# Patient Record
Sex: FEMALE | Race: WHITE | NOT HISPANIC OR LATINO | Employment: FULL TIME | URBAN - METROPOLITAN AREA
[De-identification: names, ages, dates, MRNs, and addresses within clinical notes are randomized per-mention and may not be internally consistent; named-entity substitution may affect disease eponyms.]

---

## 2017-06-12 ENCOUNTER — CONVERSION ENCOUNTER (OUTPATIENT)
Dept: FAMILY MEDICINE CLINIC | Facility: CLINIC | Age: 37
End: 2017-06-12

## 2017-06-12 ENCOUNTER — HOSPITAL ENCOUNTER (OUTPATIENT)
Dept: URGENT CARE | Facility: CLINIC | Age: 37
Setting detail: SPECIMEN
Discharge: HOME | End: 2017-06-12
Attending: FAMILY MEDICINE | Admitting: FAMILY MEDICINE

## 2017-06-12 LAB
BACTERIA SPEC AEROBE CULT: NORMAL
Lab: NORMAL
MICRO REPORT STATUS: NORMAL
SPECIMEN SOURCE: NORMAL

## 2017-09-10 ENCOUNTER — CONVERSION ENCOUNTER (OUTPATIENT)
Dept: FAMILY MEDICINE CLINIC | Facility: CLINIC | Age: 37
End: 2017-09-10

## 2018-04-06 ENCOUNTER — HOSPITAL ENCOUNTER (OUTPATIENT)
Dept: URGENT CARE | Facility: CLINIC | Age: 38
Setting detail: SPECIMEN
Discharge: HOME | End: 2018-04-06
Attending: EMERGENCY MEDICINE | Admitting: EMERGENCY MEDICINE

## 2018-04-06 ENCOUNTER — CONVERSION ENCOUNTER (OUTPATIENT)
Dept: FAMILY MEDICINE CLINIC | Facility: CLINIC | Age: 38
End: 2018-04-06

## 2018-04-06 LAB
AMPICILLIN SUSC ISLT: NORMAL
AZTREONAM SUSC ISLT: NORMAL
BACTERIA ISLT: NORMAL
BACTERIA SPEC AEROBE CULT: NORMAL
CEFAZOLIN SUSC ISLT: NORMAL
CEFEPIME SUSC ISLT: NORMAL
CEFTRIAXONE SUSC ISLT: NORMAL
CIPROFLOXACIN SUSC ISLT: NORMAL
COLONY COUNT: NORMAL
ERTAPENEM SUSC ISLT: NORMAL
LEVOFLOXACIN SUSC ISLT: NORMAL
Lab: NORMAL
MEROPENEM SUSC ISLT: NORMAL
MICRO REPORT STATUS: NORMAL
NITROFURANTOIN SUSC ISLT: NORMAL
PIP+TAZO SUSC ISLT: NORMAL
SPECIMEN SOURCE: NORMAL
SUSC METH SPEC: NORMAL
TETRACYCLINE SUSC ISLT: NORMAL
TOBRAMYCIN SUSC ISLT: NORMAL
TRIMETHOPRIM/SULFA: NORMAL

## 2018-08-01 ENCOUNTER — CONVERSION ENCOUNTER (OUTPATIENT)
Dept: FAMILY MEDICINE CLINIC | Facility: CLINIC | Age: 38
End: 2018-08-01

## 2018-08-28 ENCOUNTER — HOSPITAL ENCOUNTER (OUTPATIENT)
Dept: CARDIOLOGY | Facility: HOSPITAL | Age: 38
Discharge: HOME | End: 2018-08-28
Attending: INTERNAL MEDICINE | Admitting: INTERNAL MEDICINE

## 2018-09-12 ENCOUNTER — CONVERSION ENCOUNTER (OUTPATIENT)
Dept: FAMILY MEDICINE CLINIC | Facility: CLINIC | Age: 38
End: 2018-09-12

## 2019-06-04 VITALS
BODY MASS INDEX: 50.02 KG/M2 | HEIGHT: 64 IN | SYSTOLIC BLOOD PRESSURE: 135 MMHG | DIASTOLIC BLOOD PRESSURE: 89 MMHG | OXYGEN SATURATION: 97 % | SYSTOLIC BLOOD PRESSURE: 137 MMHG | HEIGHT: 60 IN | RESPIRATION RATE: 16 BRPM | DIASTOLIC BLOOD PRESSURE: 75 MMHG | BODY MASS INDEX: 56.93 KG/M2 | HEART RATE: 62 BPM | OXYGEN SATURATION: 98 % | HEART RATE: 86 BPM | WEIGHT: 285.4 LBS | WEIGHT: 293 LBS | HEART RATE: 60 BPM | SYSTOLIC BLOOD PRESSURE: 114 MMHG | OXYGEN SATURATION: 98 % | OXYGEN SATURATION: 98 % | DIASTOLIC BLOOD PRESSURE: 73 MMHG | OXYGEN SATURATION: 99 % | SYSTOLIC BLOOD PRESSURE: 137 MMHG | DIASTOLIC BLOOD PRESSURE: 79 MMHG | DIASTOLIC BLOOD PRESSURE: 82 MMHG | WEIGHT: 293 LBS | BODY MASS INDEX: 55.83 KG/M2 | HEIGHT: 60 IN | SYSTOLIC BLOOD PRESSURE: 135 MMHG | WEIGHT: 284.4 LBS | WEIGHT: 290 LBS | HEIGHT: 60 IN | BODY MASS INDEX: 55.1 KG/M2 | HEART RATE: 74 BPM | BODY MASS INDEX: 56.03 KG/M2

## 2020-09-19 ENCOUNTER — HOSPITAL ENCOUNTER (EMERGENCY)
Facility: HOSPITAL | Age: 40
Discharge: HOME | End: 2020-09-19
Attending: EMERGENCY MEDICINE | Admitting: EMERGENCY MEDICINE

## 2020-09-19 VITALS
BODY MASS INDEX: 50.02 KG/M2 | HEART RATE: 79 BPM | RESPIRATION RATE: 14 BRPM | DIASTOLIC BLOOD PRESSURE: 89 MMHG | HEIGHT: 64 IN | WEIGHT: 292.99 LBS | TEMPERATURE: 98.4 F | SYSTOLIC BLOOD PRESSURE: 135 MMHG | OXYGEN SATURATION: 98 %

## 2020-09-19 DIAGNOSIS — L25.9 CONTACT DERMATITIS, UNSPECIFIED CONTACT DERMATITIS TYPE, UNSPECIFIED TRIGGER: Primary | ICD-10-CM

## 2020-09-19 PROCEDURE — 99282 EMERGENCY DEPT VISIT SF MDM: CPT

## 2020-09-19 RX ORDER — TRIAMCINOLONE ACETONIDE 1 MG/G
CREAM TOPICAL 2 TIMES DAILY
Qty: 45 G | Refills: 0 | Status: SHIPPED | OUTPATIENT
Start: 2020-09-19 | End: 2022-03-24

## 2020-09-19 NOTE — DISCHARGE INSTRUCTIONS
Follow-up with your primary doctor.  Return to the emergency room for any new or worsening symptoms or if you have any other questions or concerns.  Use cream as prescribed.

## 2020-09-19 NOTE — ED PROVIDER NOTES
"Subjective   Chief complaint: Rash    40-year-old female presents with a rash.  Patient states symptoms have been present for about 2 or 3 weeks.  She states it is very itchy.  She states it is on her arms, chest, upper legs.  She went to [facility] emergency room twice and was prescribed prednisone and Atarax.  She states it did not help.  She has an appointment with her primary doctor on Tuesday.  She states she was wanting help with the itching.      History provided by:  Patient      Review of Systems   Constitutional: Negative for fever.   HENT: Negative for congestion and sore throat.    Respiratory: Negative for cough and shortness of breath.    Cardiovascular: Negative for chest pain.   Gastrointestinal: Negative for abdominal pain, diarrhea and vomiting.   Musculoskeletal: Negative for back pain.   Skin: Positive for rash.       No past medical history on file.    Allergies   Allergen Reactions   • Ibuprofen Nausea Only and GI Intolerance     BLURRED VISION, DIZZY     • Latex Hives   • Lisinopril GI Intolerance   • Macrobid  [Nitrofurantoin Macrocrystal] Itching   • Sulfamethoxazole-Trimethoprim Hives   • Nitrofurantoin Itching       No past surgical history on file.    No family history on file.    Social History     Socioeconomic History   • Marital status:      Spouse name: Not on file   • Number of children: Not on file   • Years of education: Not on file   • Highest education level: Not on file       /89 (BP Location: Left arm, Patient Position: Sitting)   Pulse 79   Temp 98.4 °F (36.9 °C) (Oral)   Resp 14   Ht 162.6 cm (64\")   Wt 133 kg (292 lb 15.9 oz)   LMP 09/15/2020   SpO2 98%   Breastfeeding No   BMI 50.29 kg/m²       Objective   Physical Exam  Constitutional:       Appearance: Normal appearance.   HENT:      Head: Normocephalic and atraumatic.      Mouth/Throat:      Mouth: Mucous membranes are moist.   Eyes:      Pupils: Pupils are equal, round, and reactive to light. "   Cardiovascular:      Rate and Rhythm: Normal rate and regular rhythm.      Heart sounds: Normal heart sounds.   Pulmonary:      Effort: Pulmonary effort is normal. No respiratory distress.      Breath sounds: Normal breath sounds.   Musculoskeletal: Normal range of motion.   Skin:     General: Skin is warm and dry.      Comments: There is an excoriated maculopapular rash on the bilateral arms, chest, upper thighs.  There is no erythema or warmth to indicate secondary infection.   Neurological:      Mental Status: She is alert and oriented to person, place, and time.         Procedures           ED Course                                           MDM   Rash has the appearance of a contact dermatitis.  She will be given a prescription for triamcinolone cream.  She is to follow-up with her primary doctor.      Final diagnoses:   Contact dermatitis, unspecified contact dermatitis type, unspecified trigger            Lion Callahan MD  09/19/20 [id number]

## 2020-12-28 ENCOUNTER — LAB (OUTPATIENT)
Dept: LAB | Facility: HOSPITAL | Age: 40
End: 2020-12-28

## 2020-12-28 DIAGNOSIS — R19.7 DIARRHEA, UNSPECIFIED TYPE: ICD-10-CM

## 2020-12-28 PROCEDURE — C9803 HOPD COVID-19 SPEC COLLECT: HCPCS

## 2020-12-28 PROCEDURE — U0003 INFECTIOUS AGENT DETECTION BY NUCLEIC ACID (DNA OR RNA); SEVERE ACUTE RESPIRATORY SYNDROME CORONAVIRUS 2 (SARS-COV-2) (CORONAVIRUS DISEASE [COVID-19]), AMPLIFIED PROBE TECHNIQUE, MAKING USE OF HIGH THROUGHPUT TECHNOLOGIES AS DESCRIBED BY CMS-2020-01-R: HCPCS | Performed by: FAMILY MEDICINE

## 2020-12-30 ENCOUNTER — TELEPHONE (OUTPATIENT)
Dept: URGENT CARE | Facility: CLINIC | Age: 40
End: 2020-12-30

## 2020-12-30 NOTE — TELEPHONE ENCOUNTER
Please contact patient.    Lab made an error and they need a new sample.    If she is still having diarrhea, this should be done.  She can take a sample in a clean container in to the hospital.  If it's after hours she can go to ER registration and they can contact the lab staff from there.    Thanks.

## 2021-01-01 ENCOUNTER — TELEPHONE (OUTPATIENT)
Dept: URGENT CARE | Facility: CLINIC | Age: 41
End: 2021-01-01

## 2021-01-04 ENCOUNTER — TELEPHONE (OUTPATIENT)
Dept: URGENT CARE | Facility: CLINIC | Age: 41
End: 2021-01-04

## 2021-01-04 NOTE — TELEPHONE ENCOUNTER
Please try another time to contact this patient.  Lab needs to collect another stool specimen, if she is still having symptoms.  She should also be seen if her symptoms are persistent.

## 2021-08-14 PROCEDURE — U0003 INFECTIOUS AGENT DETECTION BY NUCLEIC ACID (DNA OR RNA); SEVERE ACUTE RESPIRATORY SYNDROME CORONAVIRUS 2 (SARS-COV-2) (CORONAVIRUS DISEASE [COVID-19]), AMPLIFIED PROBE TECHNIQUE, MAKING USE OF HIGH THROUGHPUT TECHNOLOGIES AS DESCRIBED BY CMS-2020-01-R: HCPCS | Performed by: FAMILY MEDICINE

## 2021-09-22 ENCOUNTER — OFFICE VISIT (OUTPATIENT)
Dept: CARDIOLOGY | Facility: CLINIC | Age: 41
End: 2021-09-22

## 2021-09-22 VITALS
DIASTOLIC BLOOD PRESSURE: 76 MMHG | SYSTOLIC BLOOD PRESSURE: 131 MMHG | HEART RATE: 70 BPM | BODY MASS INDEX: 51.49 KG/M2 | OXYGEN SATURATION: 98 % | WEIGHT: 293 LBS

## 2021-09-22 DIAGNOSIS — R42 DIZZINESS: ICD-10-CM

## 2021-09-22 DIAGNOSIS — R06.02 SOB (SHORTNESS OF BREATH): ICD-10-CM

## 2021-09-22 DIAGNOSIS — I10 ESSENTIAL HYPERTENSION: ICD-10-CM

## 2021-09-22 DIAGNOSIS — R00.2 PALPITATIONS: Primary | ICD-10-CM

## 2021-09-22 PROCEDURE — 99214 OFFICE O/P EST MOD 30 MIN: CPT | Performed by: INTERNAL MEDICINE

## 2021-09-22 PROCEDURE — 93000 ELECTROCARDIOGRAM COMPLETE: CPT | Performed by: INTERNAL MEDICINE

## 2021-09-22 NOTE — PROGRESS NOTES
Cardiology Office Visit      Encounter Date:  09/22/2021    Patient ID:   Barbara Dukes is a 41 y.o. female.    Reason For Followup:  Palpitations  Shortness of breath  Fatigue and weakness    Brief Clinical History:  Dear Dr. Chan, Joel DONOVAN MD    I had the pleasure of seeing Barbara Dukes today. As you are well aware, this is a 41 y.o. female past medical history of morbid obesity status post bariatric surgery for obesity with some improvement in the weight loss presented to the office with complaints of shortness of breath fatigue weakness palpitations    Interval History:  Patient has multiple nonspecific symptoms  Her main concern is about palpitations tachycardia and some bradycardia intermittently  Nonspecific dizziness  Complaining of some shortness of breath and dyspnea on exertion which is progressively getting worse  No orthopnea no PND  No lower extremity edema    Assessment & Plan    Impressions:  Prior history of bariatric surgery  Palpitations  Shortness of breath  Tachycardia and bradycardia      Recommendations:  Echocardiogram to assess LV systolic function rule out any structural heart disease or significant valve pathology contributing to patient's symptoms  Extended Holter monitor for 2 weeks to further evaluate the tachycardia and bradycardia  Plain treadmill stress test to further evaluate the patient's symptoms of shortness of breath palpitations and dizziness  Follow-up in office in 2 months  Objective:    Vitals:  Vitals:    09/22/21 1314   BP: 131/76   Pulse: 70   SpO2: 98%   Weight: 136 kg (300 lb)       Physical Exam:    General: Alert, cooperative, no distress, appears stated age  Head:  Normocephalic, atraumatic, mucous membranes moist  Eyes:  Conjunctiva/corneas clear, EOM's intact     Neck:  Supple,  no adenopathy;      Lungs: Clear to auscultation bilaterally, no wheezes rhonchi rales are noted  Chest wall: No tenderness  Heart::  Regular rate and rhythm, S1 and S2  normal, no murmur, rub or gallop  Abdomen: Soft, non-tender, nondistended bowel sounds active  Extremities: No cyanosis, clubbing, or edema  Pulses: 2+ and symmetric all extremities  Skin:  No rashes or lesions  Neuro/psych: A&O x3. CN II through XII are grossly intact with appropriate affect      Allergies:  Allergies   Allergen Reactions   • Ibuprofen Nausea Only and GI Intolerance     BLURRED VISION, DIZZY     • Latex Hives   • Lisinopril GI Intolerance   • Macrobid  [Nitrofurantoin Macrocrystal] Itching   • Sulfamethoxazole-Trimethoprim Hives   • Nitrofurantoin Itching       Medication Review:     Current Outpatient Medications:   •  ondansetron ODT (ZOFRAN-ODT) 4 MG disintegrating tablet, 1 to 2 tablets p.o. every 4 to 6 hours as needed nausea and vomiting, Disp: 30 tablet, Rfl: 0  •  triamcinolone (KENALOG) 0.1 % cream, Apply  topically to the appropriate area as directed 2 (Two) Times a Day., Disp: 45 g, Rfl: 0    Family History:  Family History   Problem Relation Age of Onset   • Heart disease Mother    • Heart disease Father        Past Medical History:  Past Medical History:   Diagnosis Date   • History of Anton-en-Y gastric bypass        Past surgical History:  Past Surgical History:   Procedure Laterality Date   • CHOLECYSTECTOMY     • ANTON-EN-Y PROCEDURE     • TONSILLECTOMY         Social History:  Social History     Socioeconomic History   • Marital status:      Spouse name: Not on file   • Number of children: Not on file   • Years of education: Not on file   • Highest education level: Not on file   Tobacco Use   • Smoking status: Never Smoker   • Smokeless tobacco: Never Used   Vaping Use   • Vaping Use: Never used   Substance and Sexual Activity   • Alcohol use: Never   • Drug use: Not Currently   • Sexual activity: Defer       Review of Systems:  The following systems were reviewed as they relate to the cardiovascular system: Constitutional, Eyes, ENT, Cardiovascular, Respiratory,  Gastrointestinal, Integumentary, Neurological, Psychiatric, Hematologic, Endocrine, Musculoskeletal, and Genitourinary. The pertinent cardiovascular findings are reported above with all other cardiovascular points within those systems being negative.    Diagnostic Study Review:     Current Electrocardiogram:    ECG 12 Lead    Date/Time: 9/22/2021 3:09 PM  Performed by: Anurag Acosta MD  Authorized by: Anurag Acosta MD   Comparison: compared with previous ECG   Similar to previous ECG  Rhythm: sinus rhythm  Rate: normal  BPM: 70  Conduction: conduction normal  QRS axis: normal    Clinical impression: normal ECG              NOTE: The following portions of the patient's history were reviewed and updated this visit as appropriate: allergies, current medications, past family history, past medical history, past social history, past surgical history and problem list.

## 2021-10-25 ENCOUNTER — HOSPITAL ENCOUNTER (OUTPATIENT)
Dept: CARDIOLOGY | Facility: HOSPITAL | Age: 41
Discharge: HOME | End: 2021-10-25

## 2021-10-25 VITALS
HEART RATE: 63 BPM | SYSTOLIC BLOOD PRESSURE: 121 MMHG | BODY MASS INDEX: 50.02 KG/M2 | DIASTOLIC BLOOD PRESSURE: 67 MMHG | WEIGHT: 293 LBS | HEIGHT: 64 IN

## 2021-10-25 DIAGNOSIS — R42 DIZZINESS: ICD-10-CM

## 2021-10-25 DIAGNOSIS — R06.02 SOB (SHORTNESS OF BREATH): ICD-10-CM

## 2021-10-25 DIAGNOSIS — R00.2 PALPITATIONS: ICD-10-CM

## 2021-10-25 DIAGNOSIS — I10 ESSENTIAL HYPERTENSION: ICD-10-CM

## 2021-10-25 LAB
ASCENDING AORTA: 3.1 CM
BH CV ECHO MEAS - ACS: 2.9 CM
BH CV ECHO MEAS - AO MAX PG (FULL): 1.3 MMHG
BH CV ECHO MEAS - AO MAX PG: 5.7 MMHG
BH CV ECHO MEAS - AO MEAN PG (FULL): 0.64 MMHG
BH CV ECHO MEAS - AO MEAN PG: 3.2 MMHG
BH CV ECHO MEAS - AO ROOT AREA (BSA CORRECTED): 1.5
BH CV ECHO MEAS - AO ROOT AREA: 9.3 CM^2
BH CV ECHO MEAS - AO ROOT DIAM: 3.4 CM
BH CV ECHO MEAS - AO V2 MAX: 118.9 CM/SEC
BH CV ECHO MEAS - AO V2 MEAN: 84.2 CM/SEC
BH CV ECHO MEAS - AO V2 VTI: 25.2 CM
BH CV ECHO MEAS - ASC AORTA: 3.1 CM
BH CV ECHO MEAS - AVA(I,A): 3.8 CM^2
BH CV ECHO MEAS - AVA(I,D): 3.8 CM^2
BH CV ECHO MEAS - AVA(V,A): 3.6 CM^2
BH CV ECHO MEAS - AVA(V,D): 3.6 CM^2
BH CV ECHO MEAS - BSA(HAYCOCK): 2.6 M^2
BH CV ECHO MEAS - BSA: 2.3 M^2
BH CV ECHO MEAS - BZI_BMI: 51.5 KILOGRAMS/M^2
BH CV ECHO MEAS - BZI_METRIC_HEIGHT: 162.6 CM
BH CV ECHO MEAS - BZI_METRIC_WEIGHT: 136.1 KG
BH CV ECHO MEAS - EDV(CUBED): 149.1 ML
BH CV ECHO MEAS - EDV(MOD-SP2): 140.8 ML
BH CV ECHO MEAS - EDV(MOD-SP4): 121.8 ML
BH CV ECHO MEAS - EDV(TEICH): 135.5 ML
BH CV ECHO MEAS - EF(CUBED): 77.4 %
BH CV ECHO MEAS - EF(MOD-BP): 61 %
BH CV ECHO MEAS - EF(MOD-SP2): 65 %
BH CV ECHO MEAS - EF(MOD-SP4): 57.3 %
BH CV ECHO MEAS - EF(TEICH): 69.1 %
BH CV ECHO MEAS - ESV(CUBED): 33.7 ML
BH CV ECHO MEAS - ESV(MOD-SP2): 49.2 ML
BH CV ECHO MEAS - ESV(MOD-SP4): 52 ML
BH CV ECHO MEAS - ESV(TEICH): 41.9 ML
BH CV ECHO MEAS - FS: 39.1 %
BH CV ECHO MEAS - IVS/LVPW: 1.2
BH CV ECHO MEAS - IVSD: 1.3 CM
BH CV ECHO MEAS - LA DIMENSION(2D): 3.7 CM
BH CV ECHO MEAS - LA DIMENSION: 4 CM
BH CV ECHO MEAS - LA/AO: 1.1
BH CV ECHO MEAS - LAT PEAK E' VEL: 11 CM/SEC
BH CV ECHO MEAS - LV DIASTOLIC VOL/BSA (35-75): 52.4 ML/M^2
BH CV ECHO MEAS - LV IVRT: 0.06 SEC
BH CV ECHO MEAS - LV MASS(C)D: 244.4 GRAMS
BH CV ECHO MEAS - LV MASS(C)DI: 105.2 GRAMS/M^2
BH CV ECHO MEAS - LV MAX PG: 4.4 MMHG
BH CV ECHO MEAS - LV MEAN PG: 2.5 MMHG
BH CV ECHO MEAS - LV SYSTOLIC VOL/BSA (12-30): 22.4 ML/M^2
BH CV ECHO MEAS - LV V1 MAX: 104.7 CM/SEC
BH CV ECHO MEAS - LV V1 MEAN: 75.6 CM/SEC
BH CV ECHO MEAS - LV V1 VTI: 23.2 CM
BH CV ECHO MEAS - LVIDD: 5.3 CM
BH CV ECHO MEAS - LVIDS: 3.2 CM
BH CV ECHO MEAS - LVOT AREA: 4.1 CM^2
BH CV ECHO MEAS - LVOT DIAM: 2.3 CM
BH CV ECHO MEAS - LVPWD: 1.1 CM
BH CV ECHO MEAS - MED PEAK E' VEL: 11 CM/SEC
BH CV ECHO MEAS - MV A MAX VEL: 51 CM/SEC
BH CV ECHO MEAS - MV DEC SLOPE: 577.7 CM/SEC^2
BH CV ECHO MEAS - MV DEC TIME: 0.23 SEC
BH CV ECHO MEAS - MV E MAX VEL: 130 CM/SEC
BH CV ECHO MEAS - MV E/A: 2.5
BH CV ECHO MEAS - MV MAX PG: 7.8 MMHG
BH CV ECHO MEAS - MV MEAN PG: 1.9 MMHG
BH CV ECHO MEAS - MV P1/2T: 50 MSEC
BH CV ECHO MEAS - MV V2 MAX: 139.5 CM/SEC
BH CV ECHO MEAS - MV V2 MEAN: 62.6 CM/SEC
BH CV ECHO MEAS - MV V2 VTI: 32.5 CM
BH CV ECHO MEAS - MVA(P1/2T): 4.4 CM2
BH CV ECHO MEAS - MVA(VTI): 2.9 CM^2
BH CV ECHO MEAS - PA ACC SLOPE: 456 CM/SEC2
BH CV ECHO MEAS - PA ACC TIME: 0.11 SEC
BH CV ECHO MEAS - PA MAX PG (FULL): 1.6 MMHG
BH CV ECHO MEAS - PA MAX PG: 2.9 MMHG
BH CV ECHO MEAS - PA MEAN PG (FULL): 0.91 MMHG
BH CV ECHO MEAS - PA MEAN PG: 1.7 MMHG
BH CV ECHO MEAS - PA PR(ACCEL): 28.6 MMHG
BH CV ECHO MEAS - PA V2 MAX: 84.9 CM/SEC
BH CV ECHO MEAS - PA V2 MEAN: 63.4 CM/SEC
BH CV ECHO MEAS - PA V2 VTI: 22.2 CM
BH CV ECHO MEAS - PAPD(PI EDV): 7 MMHG
BH CV ECHO MEAS - PI END-D VEL: 95.8 CM/SEC
BH CV ECHO MEAS - PULM A REVS DUR: 0.15 SEC
BH CV ECHO MEAS - PULM A REVS VEL: 23.7 CM/SEC
BH CV ECHO MEAS - PULM DIAS VEL: 50.2 CM/SEC
BH CV ECHO MEAS - PULM S/D: 1.2
BH CV ECHO MEAS - PULM SYS VEL: 59.9 CM/SEC
BH CV ECHO MEAS - RAP SYSTOLE: 3 MMHG
BH CV ECHO MEAS - RV MAX PG: 1.2 MMHG
BH CV ECHO MEAS - RV MEAN PG: 0.81 MMHG
BH CV ECHO MEAS - RV V1 MAX: 55.7 CM/SEC
BH CV ECHO MEAS - RV V1 MEAN: 43.8 CM/SEC
BH CV ECHO MEAS - RV V1 VTI: 14.3 CM
BH CV ECHO MEAS - RVSP: 29.4 MMHG
BH CV ECHO MEAS - SI(AO): 101.2 ML/M^2
BH CV ECHO MEAS - SI(CUBED): 49.6 ML/M^2
BH CV ECHO MEAS - SI(LVOT): 41.1 ML/M^2
BH CV ECHO MEAS - SI(MOD-SP2): 39.4 ML/M^2
BH CV ECHO MEAS - SI(MOD-SP4): 30 ML/M^2
BH CV ECHO MEAS - SI(TEICH): 40.3 ML/M^2
BH CV ECHO MEAS - SUP REN AO DIAM: 2.3 CM
BH CV ECHO MEAS - SV(AO): 235.2 ML
BH CV ECHO MEAS - SV(CUBED): 115.4 ML
BH CV ECHO MEAS - SV(LVOT): 95.5 ML
BH CV ECHO MEAS - SV(MOD-SP2): 91.6 ML
BH CV ECHO MEAS - SV(MOD-SP4): 69.8 ML
BH CV ECHO MEAS - SV(TEICH): 93.6 ML
BH CV ECHO MEAS - TAPSE (>1.6): 3 CM
BH CV ECHO MEAS - TR MAX PG: 26 MMHG
BH CV ECHO MEAS - TR MAX VEL: 256.8 CM/SEC
BH CV ECHO MEASUREMENTS AVERAGE E/E' RATIO: 11.82
BH CV STRESS BP STAGE 1: NORMAL
BH CV STRESS BP STAGE 2: NORMAL
BH CV STRESS DURATION MIN STAGE 1: 3
BH CV STRESS DURATION MIN STAGE 2: 1
BH CV STRESS DURATION SEC STAGE 1: 0
BH CV STRESS DURATION SEC STAGE 2: 0
BH CV STRESS GRADE STAGE 1: 10
BH CV STRESS GRADE STAGE 2: 12
BH CV STRESS HR STAGE 1: 146
BH CV STRESS HR STAGE 2: 155
BH CV STRESS METS STAGE 1: 5
BH CV STRESS METS STAGE 2: 7
BH CV STRESS PROTOCOL 1: NORMAL
BH CV STRESS RECOVERY BP: NORMAL MMHG
BH CV STRESS RECOVERY HR: 81 BPM
BH CV STRESS SPEED STAGE 1: 1.7
BH CV STRESS SPEED STAGE 2: 2.5
BH CV STRESS STAGE 1: 1
BH CV STRESS STAGE 2: 2
BH CV VAS BP LEFT ARM: NORMAL MMHG
BH CV XLRA - RV BASE: 3.2 CM
BH CV XLRA - RV MID: 2.4 CM
BH CV XLRA - TDI S': 13 CM/SEC
IVRT: 62 MSEC
LEFT ATRIUM VOLUME INDEX: 24 ML/M2
LEFT ATRIUM VOLUME: 56 CM3
LV EF 2D ECHO EST: 60 %
MAXIMAL PREDICTED HEART RATE: 181 BPM
PERCENT MAX PREDICTED HR: 85.64 %
STRESS BASELINE BP: NORMAL MMHG
STRESS BASELINE HR: 79 BPM
STRESS PERCENT HR: 101 %
STRESS POST ESTIMATED WORKLOAD: 7 METS
STRESS POST EXERCISE DUR MIN: 4 MIN
STRESS POST EXERCISE DUR SEC: 0 SEC
STRESS POST PEAK BP: NORMAL MMHG
STRESS POST PEAK HR: 155 BPM
STRESS TARGET HR: 154 BPM

## 2021-10-25 PROCEDURE — 93306 TTE W/DOPPLER COMPLETE: CPT

## 2021-10-25 PROCEDURE — 93306 TTE W/DOPPLER COMPLETE: CPT | Performed by: INTERNAL MEDICINE

## 2021-10-25 PROCEDURE — 93016 CV STRESS TEST SUPVJ ONLY: CPT | Performed by: INTERNAL MEDICINE

## 2021-10-25 PROCEDURE — 93017 CV STRESS TEST TRACING ONLY: CPT

## 2021-10-25 PROCEDURE — 93018 CV STRESS TEST I&R ONLY: CPT | Performed by: INTERNAL MEDICINE

## 2021-10-26 ENCOUNTER — TELEPHONE (OUTPATIENT)
Dept: CARDIOLOGY | Facility: CLINIC | Age: 41
End: 2021-10-26

## 2021-10-26 NOTE — TELEPHONE ENCOUNTER
Called and notified patient per Dr Acosta's instructions. She verbalized understanding.      --------------------------    Anurag Acosta MD Trent, Melissa, MA    Please call the patient regarding her abnormal result.   Echocardiogram looks normal   Significant PVC burden with exercise   Recommend follow-up in the office after the Holter monitor

## 2021-11-13 LAB
MAXIMAL PREDICTED HEART RATE: 181 BPM
STRESS TARGET HR: 154 BPM

## 2021-12-16 ENCOUNTER — OFFICE VISIT (OUTPATIENT)
Dept: CARDIOLOGY | Facility: CLINIC | Age: 41
End: 2021-12-16

## 2021-12-16 VITALS
HEART RATE: 74 BPM | HEIGHT: 64 IN | SYSTOLIC BLOOD PRESSURE: 129 MMHG | WEIGHT: 293 LBS | BODY MASS INDEX: 50.02 KG/M2 | OXYGEN SATURATION: 97 % | DIASTOLIC BLOOD PRESSURE: 77 MMHG

## 2021-12-16 DIAGNOSIS — I10 ESSENTIAL HYPERTENSION: ICD-10-CM

## 2021-12-16 DIAGNOSIS — I49.3 PVC (PREMATURE VENTRICULAR CONTRACTION): ICD-10-CM

## 2021-12-16 DIAGNOSIS — Z98.84 HISTORY OF ROUX-EN-Y GASTRIC BYPASS: ICD-10-CM

## 2021-12-16 DIAGNOSIS — R06.02 SOB (SHORTNESS OF BREATH): ICD-10-CM

## 2021-12-16 DIAGNOSIS — R00.2 PALPITATIONS: Primary | ICD-10-CM

## 2021-12-16 DIAGNOSIS — R42 DIZZINESS: ICD-10-CM

## 2021-12-16 PROCEDURE — 99214 OFFICE O/P EST MOD 30 MIN: CPT | Performed by: INTERNAL MEDICINE

## 2021-12-16 NOTE — PROGRESS NOTES
Cardiology Office Visit      Encounter Date:  12/16/2021    Patient ID:   Barbara Dukes is a 41 y.o. female.    Reason For Followup:  Palpitations  Shortness of breath  Fatigue and weakness    Brief Clinical History:  Dear Dr. Chan, Joel DONOVAN MD    I had the pleasure of seeing Barbara Dukes today. As you are well aware, this is a 41 y.o. female past medical history of morbid obesity status post bariatric surgery for obesity with some improvement in the weight loss presented to the office with complaints of shortness of breath fatigue weakness palpitations        Interpretation Summary    · Left ventricular wall thickness is consistent with mild concentric hypertrophy.  · Estimated left ventricular EF = 60% Estimated left ventricular EF was in agreement with the calculated left ventricular EF. Left ventricular systolic function is normal.  · Estimated right ventricular systolic pressure from tricuspid regurgitation is normal (<35 mmHg).  · Left ventricular diastolic function was normal.    Interpretation Summary    · Patient exercised only 4 minutes on Iván protocol with a significant PVC burden with physical activity  · Significant PVC burden including ventricular bigeminy with exercise    Interpretation Summary    Extended Holter monitor for symptoms of dizziness  Patient was monitored for 11 days from October 25, 2021 to November 7, 2021  Underlying rhythm is sinus rhythm with a minimal heart rate of 47 bpm and maximal heart rate of 154 bpm and average heart rate of 72 bpm  No atrial fibrillation  No sustained supraventricular or ventricular tachycardia  1 episode of nonsustained ventricular tachycardia lasting for 5 beats at rate of 150 bpm  PVC burden of 1%  PAC burden of 1%  Patient reported multiple symptomatic episodes including symptoms of chest pain dizziness skipped beats and heart racing during those episodes patient noted to be in sinus rhythm or sinus tachycardia with occasional PACs and  "PVCs with heart rate varying between 52bpm to 107 bpm  Abnormal Holter monitor study  Clinical correlation is recommended      Interval History:  Patient has multiple nonspecific symptoms  Her main concern is about palpitations tachycardia and some bradycardia intermittently  Nonspecific dizziness  Complaining of some shortness of breath and dyspnea on exertion which is progressively getting worse  No orthopnea no PND  No lower extremity edema    Assessment & Plan    Impressions:  Prior history of bariatric surgery  Palpitations  Shortness of breath  Tachycardia and bradycardia  1 episode of nonsustained ventricular tachycardia lasting for 5 beats   PVC burden  See burden with exercise    Recommendations:  Initial intent was to start patient on some beta-blockers but she is having significant bradycardic episodes also  Schedule patient for a myocardial perfusion study to rule out any significant obstructive coronary artery disease contributing to patient's symptoms  Test results reviewed and discussed with the patient  Normal LV systolic function  Follow-up in office in 3 months    Objective:    Vitals:  Vitals:    12/16/21 1357   BP: 129/77   BP Location: Left arm   Patient Position: Sitting   Cuff Size: Large Adult   Pulse: 74   SpO2: 97%   Weight: (!) 137 kg (303 lb)   Height: 162.6 cm (64\")       Physical Exam:    General: Alert, cooperative, no distress, appears stated age  Head:  Normocephalic, atraumatic, mucous membranes moist  Eyes:  Conjunctiva/corneas clear, EOM's intact     Neck:  Supple,  no adenopathy;      Lungs: Clear to auscultation bilaterally, no wheezes rhonchi rales are noted  Chest wall: No tenderness  Heart::  Regular rate and rhythm, S1 and S2 normal, no murmur, rub or gallop  Abdomen: Soft, non-tender, nondistended bowel sounds active  Extremities: No cyanosis, clubbing, or edema  Pulses: 2+ and symmetric all extremities  Skin:  No rashes or lesions  Neuro/psych: A&O x3. CN II through XII " are grossly intact with appropriate affect      Allergies:  Allergies   Allergen Reactions   • Ibuprofen Nausea Only and GI Intolerance     BLURRED VISION, DIZZY     • Latex Hives   • Lisinopril GI Intolerance   • Macrobid  [Nitrofurantoin Macrocrystal] Itching   • Sulfamethoxazole-Trimethoprim Hives   • Nitrofurantoin Itching       Medication Review:     Current Outpatient Medications:   •  acetaminophen (TYLENOL) 325 MG tablet, Take 650 mg by mouth., Disp: , Rfl:   •  albuterol sulfate  (90 Base) MCG/ACT inhaler, Inhale 2 puffs., Disp: , Rfl:   •  ascorbic acid (VITAMIN C) 500 MG tablet, Take 500 mg by mouth Daily., Disp: , Rfl:   •  Liraglutide (VICTOZA) 18 MG/3ML solution pen-injector injection, Inject 3 mg under the skin into the appropriate area as directed Daily., Disp: , Rfl:   •  ondansetron ODT (ZOFRAN-ODT) 4 MG disintegrating tablet, 1 to 2 tablets p.o. every 4 to 6 hours as needed nausea and vomiting, Disp: 30 tablet, Rfl: 0  •  Semaglutide-Weight Management (semaglutide) 0.25 MG/0.5ML solution auto-injector, Inject 0.25 mg under the skin into the appropriate area as directed., Disp: , Rfl:   •  triamcinolone (KENALOG) 0.1 % cream, Apply  topically to the appropriate area as directed 2 (Two) Times a Day., Disp: 45 g, Rfl: 0  •  zinc gluconate 50 MG tablet, Take 50 mg by mouth Daily., Disp: , Rfl:     Family History:  Family History   Problem Relation Age of Onset   • Heart disease Mother    • Heart disease Father        Past Medical History:  Past Medical History:   Diagnosis Date   • History of Anton-en-Y gastric bypass        Past surgical History:  Past Surgical History:   Procedure Laterality Date   • CHOLECYSTECTOMY     • ANTON-EN-Y PROCEDURE     • TONSILLECTOMY         Social History:  Social History     Socioeconomic History   • Marital status:    Tobacco Use   • Smoking status: Never Smoker   • Smokeless tobacco: Never Used   Vaping Use   • Vaping Use: Never used   Substance and  Sexual Activity   • Alcohol use: Never   • Drug use: Not Currently   • Sexual activity: Defer       Review of Systems:  The following systems were reviewed as they relate to the cardiovascular system: Constitutional, Eyes, ENT, Cardiovascular, Respiratory, Gastrointestinal, Integumentary, Neurological, Psychiatric, Hematologic, Endocrine, Musculoskeletal, and Genitourinary. The pertinent cardiovascular findings are reported above with all other cardiovascular points within those systems being negative.    Diagnostic Study Review:     Current Electrocardiogram:  Procedures      NOTE: The following portions of the patient's history were reviewed and updated this visit as appropriate: allergies, current medications, past family history, past medical history, past social history, past surgical history and problem list.

## 2022-01-17 ENCOUNTER — HOSPITAL ENCOUNTER (OUTPATIENT)
Dept: NUCLEAR MEDICINE | Facility: HOSPITAL | Age: 42
Discharge: HOME | End: 2022-01-17

## 2022-01-17 DIAGNOSIS — R06.02 SOB (SHORTNESS OF BREATH): ICD-10-CM

## 2022-01-17 DIAGNOSIS — R00.2 PALPITATIONS: ICD-10-CM

## 2022-01-17 DIAGNOSIS — I49.3 PVC (PREMATURE VENTRICULAR CONTRACTION): ICD-10-CM

## 2022-01-17 LAB
BH CV REST NUCLEAR ISOTOPE DOSE: 6.6 MCI
BH CV STRESS BP STAGE 1: NORMAL
BH CV STRESS BP STAGE 2: NORMAL
BH CV STRESS COMMENTS STAGE 1: NORMAL
BH CV STRESS COMMENTS STAGE 2: NORMAL
BH CV STRESS DOSE REGADENOSON STAGE 1: 0.4
BH CV STRESS DURATION MIN STAGE 1: 0
BH CV STRESS DURATION MIN STAGE 2: 4
BH CV STRESS DURATION SEC STAGE 1: 10
BH CV STRESS DURATION SEC STAGE 2: 0
BH CV STRESS HR STAGE 1: 53
BH CV STRESS HR STAGE 2: 100
BH CV STRESS NUCLEAR ISOTOPE DOSE: 19.1 MCI
BH CV STRESS PROTOCOL 1: NORMAL
BH CV STRESS RECOVERY BP: NORMAL MMHG
BH CV STRESS RECOVERY HR: 73 BPM
BH CV STRESS STAGE 1: 1
BH CV STRESS STAGE 2: 2
LV EF NUC BP: 60 %
MAXIMAL PREDICTED HEART RATE: 181 BPM
PERCENT MAX PREDICTED HR: 56.91 %
STRESS BASELINE BP: NORMAL MMHG
STRESS BASELINE HR: 53 BPM
STRESS PERCENT HR: 67 %
STRESS POST PEAK BP: NORMAL MMHG
STRESS POST PEAK HR: 103 BPM
STRESS TARGET HR: 154 BPM

## 2022-01-17 PROCEDURE — 0 TECHNETIUM TETROFOSMIN KIT: Performed by: INTERNAL MEDICINE

## 2022-01-17 PROCEDURE — 93017 CV STRESS TEST TRACING ONLY: CPT

## 2022-01-17 PROCEDURE — 78452 HT MUSCLE IMAGE SPECT MULT: CPT | Performed by: INTERNAL MEDICINE

## 2022-01-17 PROCEDURE — A9502 TC99M TETROFOSMIN: HCPCS | Performed by: INTERNAL MEDICINE

## 2022-01-17 PROCEDURE — 93016 CV STRESS TEST SUPVJ ONLY: CPT | Performed by: INTERNAL MEDICINE

## 2022-01-17 PROCEDURE — 93018 CV STRESS TEST I&R ONLY: CPT | Performed by: INTERNAL MEDICINE

## 2022-01-17 PROCEDURE — 78452 HT MUSCLE IMAGE SPECT MULT: CPT

## 2022-01-17 PROCEDURE — 25010000002 REGADENOSON 0.4 MG/5ML SOLUTION: Performed by: INTERNAL MEDICINE

## 2022-01-17 RX ADMIN — REGADENOSON 0.4 MG: 0.08 INJECTION, SOLUTION INTRAVENOUS at 12:04

## 2022-01-17 RX ADMIN — TETROFOSMIN 1 DOSE: 1.38 INJECTION, POWDER, LYOPHILIZED, FOR SOLUTION INTRAVENOUS at 10:12

## 2022-01-17 RX ADMIN — TETROFOSMIN 1 DOSE: 1.38 INJECTION, POWDER, LYOPHILIZED, FOR SOLUTION INTRAVENOUS at 12:04

## 2022-01-26 ENCOUNTER — TELEPHONE (OUTPATIENT)
Dept: CARDIOLOGY | Facility: CLINIC | Age: 42
End: 2022-01-26

## 2022-01-26 NOTE — TELEPHONE ENCOUNTER
Notified pt.   ----- Message from Anurag Acosta MD sent at 1/25/2022  1:08 PM EST -----  Stress test is normal with no abnormalities  Okay to give letter  ----- Message -----  From: Rufina Aldridge MA  Sent: 1/25/2022  11:14 AM EST  To: Anurag Acosta MD    Pt called asking about test results and also is requesting a letter stating that none of her cardiac testing that was ordered under you isn't related to Covid.

## 2022-01-26 NOTE — TELEPHONE ENCOUNTER
Notified pt    ----- Message from Anurag Aocsta MD sent at 1/18/2022  7:35 AM EST -----  Normal stress test

## 2022-03-24 ENCOUNTER — OFFICE VISIT (OUTPATIENT)
Dept: CARDIOLOGY | Facility: CLINIC | Age: 42
End: 2022-03-24

## 2022-03-24 VITALS
OXYGEN SATURATION: 97 % | SYSTOLIC BLOOD PRESSURE: 132 MMHG | BODY MASS INDEX: 50.02 KG/M2 | HEART RATE: 65 BPM | WEIGHT: 293 LBS | DIASTOLIC BLOOD PRESSURE: 83 MMHG | RESPIRATION RATE: 18 BRPM | HEIGHT: 64 IN

## 2022-03-24 DIAGNOSIS — Z98.84 HISTORY OF ROUX-EN-Y GASTRIC BYPASS: ICD-10-CM

## 2022-03-24 DIAGNOSIS — R42 DIZZINESS: ICD-10-CM

## 2022-03-24 DIAGNOSIS — R06.02 SOB (SHORTNESS OF BREATH): ICD-10-CM

## 2022-03-24 DIAGNOSIS — I10 ESSENTIAL HYPERTENSION: ICD-10-CM

## 2022-03-24 DIAGNOSIS — R00.2 PALPITATIONS: Primary | ICD-10-CM

## 2022-03-24 PROCEDURE — 93000 ELECTROCARDIOGRAM COMPLETE: CPT | Performed by: INTERNAL MEDICINE

## 2022-03-24 PROCEDURE — 99214 OFFICE O/P EST MOD 30 MIN: CPT | Performed by: INTERNAL MEDICINE

## 2022-03-24 RX ORDER — ASPIRIN 81 MG/1
81 TABLET ORAL DAILY
COMMUNITY
End: 2022-09-22

## 2022-03-24 RX ORDER — ERGOCALCIFEROL 1.25 MG/1
50000 CAPSULE ORAL WEEKLY
COMMUNITY

## 2022-03-24 RX ORDER — CHLORAL HYDRATE 500 MG
CAPSULE ORAL
COMMUNITY

## 2022-03-24 NOTE — PROGRESS NOTES
Cardiology Office Visit      Encounter Date:  03/24/2022    Patient ID:   Barbara Dukes is a 41 y.o. female.    Reason For Followup:  Palpitations  Shortness of breath  Fatigue and weakness    Brief Clinical History:  Dear Dr. Chan, Joel DONOVAN MD    I had the pleasure of seeing Barbara Dukes today. As you are well aware, this is a 41 y.o. female past medical history of morbid obesity status post bariatric surgery for obesity with some improvement in the weight loss presented to the office with complaints of shortness of breath fatigue weakness palpitations        Interpretation Summary    · Left ventricular wall thickness is consistent with mild concentric hypertrophy.  · Estimated left ventricular EF = 60% Estimated left ventricular EF was in agreement with the calculated left ventricular EF. Left ventricular systolic function is normal.  · Estimated right ventricular systolic pressure from tricuspid regurgitation is normal (<35 mmHg).  · Left ventricular diastolic function was normal.    Interpretation Summary    · Patient exercised only 4 minutes on Iván protocol with a significant PVC burden with physical activity  · Significant PVC burden including ventricular bigeminy with exercise    Interpretation Summary    Extended Holter monitor for symptoms of dizziness  Patient was monitored for 11 days from October 25, 2021 to November 7, 2021  Underlying rhythm is sinus rhythm with a minimal heart rate of 47 bpm and maximal heart rate of 154 bpm and average heart rate of 72 bpm  No atrial fibrillation  No sustained supraventricular or ventricular tachycardia  1 episode of nonsustained ventricular tachycardia lasting for 5 beats at rate of 150 bpm  PVC burden of 1%  PAC burden of 1%  Patient reported multiple symptomatic episodes including symptoms of chest pain dizziness skipped beats and heart racing during those episodes patient noted to be in sinus rhythm or sinus tachycardia with occasional PACs and  "PVCs with heart rate varying between 52bpm to 107 bpm  Abnormal Holter monitor study  Clinical correlation is recommended  Interpretation Summary    · Findings consistent with a normal ECG stress test.  · Left ventricular ejection fraction is normal. (Calculated EF = 60%).  · Myocardial perfusion imaging indicates a normal myocardial perfusion study with no evidence of ischemia.  · Impressions are consistent with a low risk study.             Interval History:  Patient has multiple nonspecific symptoms  Her main concern is about palpitations tachycardia and some bradycardia intermittently  Nonspecific dizziness  Complaining of some shortness of breath and dyspnea on exertion which is progressively getting worse  No orthopnea no PND  No lower extremity edema    Assessment & Plan    Impressions:  Prior history of bariatric surgery  Palpitations  Shortness of breath  Tachycardia and bradycardia  1 episode of nonsustained ventricular tachycardia lasting for 5 beats   PVC burden  PVC burden with exercise  Normal stress test with no inducible ischemia    Recommendations:  Patient is advised to consider beta-blockers if she has ongoing symptoms  Schedule to be seen by ENT and pulmonary  And aggressive risk factor modification regular exercise weight loss reviewed and discussed with patient  Aggressive treatment of underlying obstructive sleep apnea  Test results reviewed and discussed with the patient  Normal LV systolic function  Follow-up in office in 6 months    Objective:    Vitals:  Vitals:    03/24/22 1532   BP: 132/83   BP Location: Left arm   Patient Position: Sitting   Cuff Size: Large Adult   Pulse: 65   Resp: 18   SpO2: 97%   Weight: (!) 140 kg (309 lb)   Height: 162.6 cm (64\")       Physical Exam:    General: Alert, cooperative, no distress, appears stated age  Head:  Normocephalic, atraumatic, mucous membranes moist  Eyes:  Conjunctiva/corneas clear, EOM's intact     Neck:  Supple,  no adenopathy;    "   Lungs: Clear to auscultation bilaterally, no wheezes rhonchi rales are noted  Chest wall: No tenderness  Heart::  Regular rate and rhythm, S1 and S2 normal, no murmur, rub or gallop  Abdomen: Soft, non-tender, nondistended bowel sounds active  Extremities: No cyanosis, clubbing, or edema  Pulses: 2+ and symmetric all extremities  Skin:  No rashes or lesions  Neuro/psych: A&O x3. CN II through XII are grossly intact with appropriate affect      Allergies:  Allergies   Allergen Reactions   • Ibuprofen Nausea Only and GI Intolerance     BLURRED VISION, DIZZY     • Latex Hives   • Lisinopril GI Intolerance   • Macrobid  [Nitrofurantoin Macrocrystal] Itching   • Sulfamethoxazole-Trimethoprim Hives   • Nitrofurantoin Itching       Medication Review:     Current Outpatient Medications:   •  aspirin 81 MG EC tablet, Take 81 mg by mouth Daily., Disp: , Rfl:   •  Omega-3 Fatty Acids (fish oil) 1000 MG capsule capsule, Take  by mouth Daily With Breakfast., Disp: , Rfl:   •  ondansetron ODT (ZOFRAN-ODT) 4 MG disintegrating tablet, 1 to 2 tablets p.o. every 4 to 6 hours as needed nausea and vomiting, Disp: 30 tablet, Rfl: 0  •  vitamin D (ERGOCALCIFEROL) 1.25 MG (75480 UT) capsule capsule, Take 50,000 Units by mouth 1 (One) Time Per Week., Disp: , Rfl:     Family History:  Family History   Problem Relation Age of Onset   • Heart disease Mother    • Heart disease Father        Past Medical History:  Past Medical History:   Diagnosis Date   • History of Anton-en-Y gastric bypass        Past surgical History:  Past Surgical History:   Procedure Laterality Date   • CHOLECYSTECTOMY     • ANTON-EN-Y PROCEDURE     • TONSILLECTOMY         Social History:  Social History     Socioeconomic History   • Marital status:    Tobacco Use   • Smoking status: Never Smoker   • Smokeless tobacco: Never Used   Vaping Use   • Vaping Use: Never used   Substance and Sexual Activity   • Alcohol use: Never   • Drug use: Not Currently   • Sexual  activity: Defer       Review of Systems:  The following systems were reviewed as they relate to the cardiovascular system: Constitutional, Eyes, ENT, Cardiovascular, Respiratory, Gastrointestinal, Integumentary, Neurological, Psychiatric, Hematologic, Endocrine, Musculoskeletal, and Genitourinary. The pertinent cardiovascular findings are reported above with all other cardiovascular points within those systems being negative.    Diagnostic Study Review:     Current Electrocardiogram:    ECG 12 Lead    Date/Time: 3/24/2022 4:04 PM  Performed by: Anurag Acosta MD  Authorized by: Anurag Acosta MD   Comparison: compared with previous ECG   Similar to previous ECG  Rhythm: sinus rhythm  Rate: normal  BPM: 66  Conduction: conduction normal  ST Segments: ST segments normal  T Waves: T waves normal  QRS axis: normal    Clinical impression: normal ECG              NOTE: The following portions of the patient's history were reviewed and updated this visit as appropriate: allergies, current medications, past family history, past medical history, past social history, past surgical history and problem list.

## 2022-09-22 ENCOUNTER — OFFICE VISIT (OUTPATIENT)
Dept: CARDIOLOGY | Facility: CLINIC | Age: 42
End: 2022-09-22

## 2022-09-22 VITALS
HEIGHT: 64 IN | HEART RATE: 82 BPM | OXYGEN SATURATION: 98 % | WEIGHT: 293 LBS | RESPIRATION RATE: 18 BRPM | SYSTOLIC BLOOD PRESSURE: 136 MMHG | BODY MASS INDEX: 50.02 KG/M2 | DIASTOLIC BLOOD PRESSURE: 84 MMHG

## 2022-09-22 DIAGNOSIS — R42 DIZZINESS: ICD-10-CM

## 2022-09-22 DIAGNOSIS — I10 ESSENTIAL HYPERTENSION: ICD-10-CM

## 2022-09-22 DIAGNOSIS — R00.2 PALPITATIONS: Primary | ICD-10-CM

## 2022-09-22 DIAGNOSIS — R06.02 SOB (SHORTNESS OF BREATH): ICD-10-CM

## 2022-09-22 PROCEDURE — 93000 ELECTROCARDIOGRAM COMPLETE: CPT | Performed by: INTERNAL MEDICINE

## 2022-09-22 PROCEDURE — 99214 OFFICE O/P EST MOD 30 MIN: CPT | Performed by: INTERNAL MEDICINE

## 2022-09-22 NOTE — PROGRESS NOTES
Cardiology Office Visit      Encounter Date:  09/22/2022    Patient ID:   Barbara Dukes is a 42 y.o. female.    Reason For Followup:  Palpitations  Shortness of breath  Fatigue and weakness    Brief Clinical History:  Dear Dr. Chan, Joel DONOVAN MD    I had the pleasure of seeing Barbara Dukes today. As you are well aware, this is a 42 y.o. female past medical history of morbid obesity status post bariatric surgery for obesity with some improvement in the weight loss presented to the office with complaints of shortness of breath fatigue weakness palpitations        Interpretation Summary    · Left ventricular wall thickness is consistent with mild concentric hypertrophy.  · Estimated left ventricular EF = 60% Estimated left ventricular EF was in agreement with the calculated left ventricular EF. Left ventricular systolic function is normal.  · Estimated right ventricular systolic pressure from tricuspid regurgitation is normal (<35 mmHg).  · Left ventricular diastolic function was normal.    Interpretation Summary    · Patient exercised only 4 minutes on Iván protocol with a significant PVC burden with physical activity  · Significant PVC burden including ventricular bigeminy with exercise    Interpretation Summary    Extended Holter monitor for symptoms of dizziness  Patient was monitored for 11 days from October 25, 2021 to November 7, 2021  Underlying rhythm is sinus rhythm with a minimal heart rate of 47 bpm and maximal heart rate of 154 bpm and average heart rate of 72 bpm  No atrial fibrillation  No sustained supraventricular or ventricular tachycardia  1 episode of nonsustained ventricular tachycardia lasting for 5 beats at rate of 150 bpm  PVC burden of 1%  PAC burden of 1%  Patient reported multiple symptomatic episodes including symptoms of chest pain dizziness skipped beats and heart racing during those episodes patient noted to be in sinus rhythm or sinus tachycardia with occasional PACs and  "PVCs with heart rate varying between 52bpm to 107 bpm  Abnormal Holter monitor study  Clinical correlation is recommended  Interpretation Summary    · Findings consistent with a normal ECG stress test.  · Left ventricular ejection fraction is normal. (Calculated EF = 60%).  · Myocardial perfusion imaging indicates a normal myocardial perfusion study with no evidence of ischemia.  · Impressions are consistent with a low risk study.             Interval History:  Clinically feels better  Palpitations improved  No orthopnea no PND  No lower extremity edema    Assessment & Plan    Impressions:  Prior history of bariatric surgery  Palpitations  Shortness of breath  Tachycardia and bradycardia  1 episode of nonsustained ventricular tachycardia lasting for 5 beats   PVC burden  PVC burden with exercise  Normal stress test with no inducible ischemia  Hypercoagulable state with MTHFR mutation    Recommendations:  Patient is advised to consider beta-blockers if she has ongoing symptoms  Continue current medical therapy  Continue aspirin for MTHFR mutation  And aggressive risk factor modification regular exercise weight loss reviewed and discussed with patient  Aggressive treatment of underlying obstructive sleep apnea  Test results reviewed and discussed with the patient  Normal LV systolic function  Follow-up in office in 6 months  Objective:    Vitals:  Vitals:    09/22/22 1531   BP: 136/84   BP Location: Left arm   Patient Position: Sitting   Cuff Size: Large Adult   Pulse: 82   Resp: 18   SpO2: 98%   Weight: (!) 141 kg (311 lb)   Height: 162.6 cm (64\")       Physical Exam:    General: Alert, cooperative, no distress, appears stated age  Head:  Normocephalic, atraumatic, mucous membranes moist  Eyes:  Conjunctiva/corneas clear, EOM's intact     Neck:  Supple,  no adenopathy;      Lungs: Clear to auscultation bilaterally, no wheezes rhonchi rales are noted  Chest wall: No tenderness  Heart::  Regular rate and rhythm, S1 and " S2 normal, no murmur, rub or gallop  Abdomen: Soft, non-tender, nondistended bowel sounds active  Extremities: No cyanosis, clubbing, or edema  Pulses: 2+ and symmetric all extremities  Skin:  No rashes or lesions  Neuro/psych: A&O x3. CN II through XII are grossly intact with appropriate affect      Allergies:  Allergies   Allergen Reactions   • Ibuprofen Nausea Only and GI Intolerance     BLURRED VISION, DIZZY     • Latex Hives   • Lisinopril GI Intolerance   • Macrobid  [Nitrofurantoin Macrocrystal] Itching   • Sulfamethoxazole-Trimethoprim Hives   • Nitrofurantoin Itching       Medication Review:     Current Outpatient Medications:   •  Omega-3 Fatty Acids (fish oil) 1000 MG capsule capsule, Take  by mouth Daily With Breakfast., Disp: , Rfl:   •  ondansetron ODT (ZOFRAN-ODT) 4 MG disintegrating tablet, 1 to 2 tablets p.o. every 4 to 6 hours as needed nausea and vomiting, Disp: 30 tablet, Rfl: 0  •  vitamin D (ERGOCALCIFEROL) 1.25 MG (71716 UT) capsule capsule, Take 50,000 Units by mouth 1 (One) Time Per Week., Disp: , Rfl:     Family History:  Family History   Problem Relation Age of Onset   • Heart disease Mother    • Heart disease Father        Past Medical History:  Past Medical History:   Diagnosis Date   • History of Anton-en-Y gastric bypass        Past surgical History:  Past Surgical History:   Procedure Laterality Date   • CHOLECYSTECTOMY     • ANTON-EN-Y PROCEDURE     • TONSILLECTOMY         Social History:  Social History     Socioeconomic History   • Marital status:    Tobacco Use   • Smoking status: Never Smoker   • Smokeless tobacco: Never Used   Vaping Use   • Vaping Use: Never used   Substance and Sexual Activity   • Alcohol use: Never   • Drug use: Not Currently   • Sexual activity: Defer       Review of Systems:  The following systems were reviewed as they relate to the cardiovascular system: Constitutional, Eyes, ENT, Cardiovascular, Respiratory, Gastrointestinal, Integumentary,  Neurological, Psychiatric, Hematologic, Endocrine, Musculoskeletal, and Genitourinary. The pertinent cardiovascular findings are reported above with all other cardiovascular points within those systems being negative.    Diagnostic Study Review:     Current Electrocardiogram:    ECG 12 Lead    Date/Time: 9/22/2022 4:43 PM  Performed by: Anurag Acosta MD  Authorized by: Anurag Acosta MD   Comparison: compared with previous ECG   Similar to previous ECG  Rhythm: sinus rhythm  Rate: normal  BPM: 82  Conduction: conduction normal  ST Segments: ST segments normal  T Waves: T waves normal  QRS axis: normal    Clinical impression: normal ECG              NOTE: The following portions of the patient's history were reviewed and updated this visit as appropriate: allergies, current medications, past family history, past medical history, past social history, past surgical history and problem list.

## 2025-06-28 ENCOUNTER — HOSPITAL ENCOUNTER (EMERGENCY)
Facility: HOSPITAL | Age: 45
Discharge: HOME | End: 2025-06-28
Attending: EMERGENCY MEDICINE
Payer: COMMERCIAL

## 2025-06-28 VITALS
WEIGHT: 293 LBS | OXYGEN SATURATION: 100 % | DIASTOLIC BLOOD PRESSURE: 80 MMHG | SYSTOLIC BLOOD PRESSURE: 173 MMHG | HEART RATE: 74 BPM | RESPIRATION RATE: 16 BRPM | BODY MASS INDEX: 50.02 KG/M2 | HEIGHT: 64 IN | TEMPERATURE: 98.9 F

## 2025-06-28 DIAGNOSIS — Z32.01 POSITIVE PREGNANCY TEST: ICD-10-CM

## 2025-06-28 DIAGNOSIS — R31.9 HEMATURIA, UNSPECIFIED TYPE: Primary | ICD-10-CM

## 2025-06-28 LAB
B-HCG UR QL: POSITIVE
BILIRUB UR QL STRIP: NEGATIVE
CLARITY UR: ABNORMAL
COLOR UR: YELLOW
GLUCOSE UR STRIP-MCNC: NEGATIVE MG/DL
HCG INTACT+B SERPL-ACNC: 1640 MIU/ML
HGB UR QL STRIP.AUTO: ABNORMAL
KETONES UR QL STRIP: ABNORMAL
LEUKOCYTE ESTERASE UR QL STRIP.AUTO: ABNORMAL
NITRITE UR QL STRIP: NEGATIVE
PH UR STRIP.AUTO: 6 [PH] (ref 5–8)
PROT UR QL STRIP: NEGATIVE
SP GR UR STRIP: 1.02 (ref 1–1.03)
UROBILINOGEN UR QL STRIP: ABNORMAL

## 2025-06-28 PROCEDURE — 99283 EMERGENCY DEPT VISIT LOW MDM: CPT

## 2025-06-28 PROCEDURE — 36415 COLL VENOUS BLD VENIPUNCTURE: CPT

## 2025-06-28 PROCEDURE — 81025 URINE PREGNANCY TEST: CPT

## 2025-06-28 PROCEDURE — 81003 URINALYSIS AUTO W/O SCOPE: CPT

## 2025-06-28 PROCEDURE — 84702 CHORIONIC GONADOTROPIN TEST: CPT

## 2025-06-28 NOTE — FSED PROVIDER NOTE
Subjective   History of Present Illness  44-year-old female reports she was seen at a [place] facility this past week and told she had a UTI.  She states that they did not tell her anything else and that she is concerned that she is still having a pink smear when she wipes.  Denies vaginal hemorrhaging or soaking through greater than 1 pad per hour she is denying flank pain back pain she is denying chest pain and shortness of breath.  She reports she is taking Keflex.  She is unsure if she had blood work drawn at the [place] facility.  She reports that she has not had an ultrasound and plans to see her OB/GYN this coming Wednesday.        Review of Systems   All other systems reviewed and are negative.      Past Medical History:   Diagnosis Date    History of Anton-en-Y gastric bypass        Allergies   Allergen Reactions    Ibuprofen Nausea Only and GI Intolerance     BLURRED VISION, DIZZY      Latex Hives    Lisinopril GI Intolerance    Macrobid  [Nitrofurantoin Macrocrystal] Itching    Sulfamethoxazole-Trimethoprim Hives    Nitrofurantoin Itching       Past Surgical History:   Procedure Laterality Date    CHOLECYSTECTOMY      ANTON-EN-Y PROCEDURE      TONSILLECTOMY         Family History   Problem Relation Age of Onset    Heart disease Mother     Heart disease Father        Social History     Socioeconomic History    Marital status:    Tobacco Use    Smoking status: Never    Smokeless tobacco: Never   Vaping Use    Vaping status: Never Used   Substance and Sexual Activity    Alcohol use: Never    Drug use: Not Currently    Sexual activity: Yes     Partners: Male           Objective   Physical Exam  Vitals and nursing note reviewed.   Constitutional:       General: She is not in acute distress.     Appearance: She is not toxic-appearing.   HENT:      Head: Normocephalic and atraumatic.      Nose: Nose normal.      Mouth/Throat:      Mouth: Mucous membranes are moist.      Pharynx: Oropharynx is clear.   Eyes:       Extraocular Movements: Extraocular movements intact.      Conjunctiva/sclera: Conjunctivae normal.      Pupils: Pupils are equal, round, and reactive to light.   Cardiovascular:      Rate and Rhythm: Normal rate.      Pulses: Normal pulses.   Pulmonary:      Effort: Pulmonary effort is normal. No respiratory distress.      Breath sounds: Normal breath sounds.   Abdominal:      General: Abdomen is flat. There is no distension.      Palpations: Abdomen is soft. There is no mass.      Tenderness: There is no abdominal tenderness. There is no right CVA tenderness, left CVA tenderness, guarding or rebound.      Hernia: No hernia is present.   Musculoskeletal:         General: Normal range of motion.      Cervical back: Normal range of motion and neck supple.   Skin:     General: Skin is warm.      Capillary Refill: Capillary refill takes less than 2 seconds.   Neurological:      General: No focal deficit present.      Mental Status: She is alert and oriented to person, place, and time. Mental status is at baseline.         Procedures           ED Course                                           Medical Decision Making  It appears the patient's main reason for her visit is that she is concerned about the bacteria that was found in her urine culture.  Review of clinical record shows the patient was seen in a [facility] facility 2 days ago and had a confirmed uterine pregnancy via ultrasound.    Patient also had blood work.  The urine culture does show that Keflex is appropriate    I have explained to the patient that she needs to continue Keflex as she is only on day 3 of Keflex this is an appropriate medication based upon urine culture results.    In terms of patient's pregnancy I explained to the patient that she did have an ultrasound 2 days ago which confirmed an intrauterine pregnancy.  I am not sure why she did not share this with me initially.    I further explained that patient had a serum quantitative blood  level drawn 2 days ago and that a repeat 2 days later is not necessary however the patient is requesting it, so will move forward with hCG blood draw.    I have explained to the patient that the hCG blood draw typically takes a little bit longer than typical blood and that patient may be here an additional hour.  She is preferring to be discharged home and plans to follow-up on [facility] or with her OB/GYN this coming Monday    Strict return precautions verbalized regarding vaginal hemorrhaging.    Problems Addressed:  Hematuria, unspecified type: complicated acute illness or injury  Positive pregnancy test: complicated acute illness or injury    Amount and/or Complexity of Data Reviewed  Labs: ordered.        Final diagnoses:   Hematuria, unspecified type   Positive pregnancy test       ED Disposition  ED Disposition       ED Disposition   Discharge    Condition   Stable    Comment   --               Joel Chan MD  [address] [facility] [place] [address] [phone]               Medication List      No changes were made to your prescriptions during this visit.

## 2025-06-28 NOTE — DISCHARGE INSTRUCTIONS
Continue Keflex for treatment of urinary tract infection    If you vaginal bleeding that causes you to soak greater than 1 pad an hour present to the nearest ER hospital.    Follow-up with your OB/GYN as scheduled    Return to ER for worsening symptoms